# Patient Record
Sex: FEMALE | Race: WHITE | Employment: FULL TIME | URBAN - METROPOLITAN AREA
[De-identification: names, ages, dates, MRNs, and addresses within clinical notes are randomized per-mention and may not be internally consistent; named-entity substitution may affect disease eponyms.]

---

## 2017-05-05 ENCOUNTER — APPOINTMENT (OUTPATIENT)
Dept: GENERAL RADIOLOGY | Age: 42
End: 2017-05-05
Attending: EMERGENCY MEDICINE
Payer: COMMERCIAL

## 2017-05-05 ENCOUNTER — HOSPITAL ENCOUNTER (OUTPATIENT)
Age: 42
Discharge: HOME | End: 2017-05-05
Attending: EMERGENCY MEDICINE
Payer: COMMERCIAL

## 2017-05-05 VITALS
RESPIRATION RATE: 18 BRPM | BODY MASS INDEX: 29.99 KG/M2 | OXYGEN SATURATION: 100 % | WEIGHT: 180 LBS | DIASTOLIC BLOOD PRESSURE: 93 MMHG | HEIGHT: 65 IN | SYSTOLIC BLOOD PRESSURE: 147 MMHG | TEMPERATURE: 99 F | HEART RATE: 60 BPM

## 2017-05-05 DIAGNOSIS — R07.9 ACUTE CHEST PAIN: Primary | ICD-10-CM

## 2017-05-05 DIAGNOSIS — Z56.6 STRESS AT WORK: ICD-10-CM

## 2017-05-05 PROCEDURE — 99204 OFFICE O/P NEW MOD 45 MIN: CPT

## 2017-05-05 PROCEDURE — 71020 XR CHEST PA + LAT CHEST (CPT=71020): CPT | Performed by: EMERGENCY MEDICINE

## 2017-05-05 PROCEDURE — 93010 ELECTROCARDIOGRAM REPORT: CPT

## 2017-05-05 PROCEDURE — 93005 ELECTROCARDIOGRAM TRACING: CPT

## 2017-05-05 PROCEDURE — 99205 OFFICE O/P NEW HI 60 MIN: CPT

## 2017-05-05 PROCEDURE — 93010 ELECTROCARDIOGRAM REPORT: CPT | Performed by: EMERGENCY MEDICINE

## 2017-05-05 SDOH — HEALTH STABILITY - MENTAL HEALTH: OTHER PHYSICAL AND MENTAL STRAIN RELATED TO WORK: Z56.6

## 2017-05-05 NOTE — ED INITIAL ASSESSMENT (HPI)
Presents with intermittent chest tightness since yesterday. Reports palpitations yesterday described as a \"flutter\" for a few minutes. Also reports bilateral neck pain and states \"feels like I have to pop my ears. \" All symptoms occur after lunch and re

## 2017-05-06 NOTE — ED PROVIDER NOTES
Patient Seen in: [facility] Immediate Care In [address] [place] [address]    History   Patient presents with:  Chest Pain Angina (cardiovascular)    Stated Complaint: palpatations, chest tighness, h/a    HPI    40-year-old female presents for complaint of chest tigh complaint: palpatations, chest tighness, h/a  Other systems are as noted in HPI. Constitutional and vital signs reviewed. All other systems reviewed and negative except as noted above.     PSFH elements reviewed from today and agreed except as otherwi MDM    EKG is unremarkable. Chest x-ray without any acute findings. Patient's blood pressure did improve. It was still elevated. She is to follow-up regarding this. Patient's symptoms likely secondary to anxiety and stress.   She does have family h in 2 days  Primary care follow up if you don't have a PCP      Medications Prescribed:  There are no discharge medications for this patient.

## 2019-04-16 PROCEDURE — 88175 CYTOPATH C/V AUTO FLUID REDO: CPT | Performed by: OBSTETRICS & GYNECOLOGY

## 2019-04-16 PROCEDURE — 87624 HPV HI-RISK TYP POOLED RSLT: CPT | Performed by: OBSTETRICS & GYNECOLOGY

## 2019-04-16 PROCEDURE — 88304 TISSUE EXAM BY PATHOLOGIST: CPT | Performed by: OBSTETRICS & GYNECOLOGY

## (undated) NOTE — ED AVS SNAPSHOT
[facility] AND [facility] Immediate Care in [facility] [address].  [address] [facility]    Phone:  [phone]    Fax:  [phone]           Tank Spenser   MRN: [id number]    Department:  [facility] AND [facility] Immediate Care in [address] [place] [address]   Date of Visit: aspect of your visit today. In an effort to constantly improve our service to you, we would appreciate any positive or negative feedback related to the care you received in our Immediate Care. Please call our [address] at [phone].   Your Immediate contact you. Please make sure we have your correct phone number on file.      OUR CURRENT HOURS OF OPERATION:  [address] [facility]  WEEKENDS AND HOLIDAYS 8AM - 6PM    I certified that I have received a copy of the aftercare instructions; that t [employer] will allow you to access patient instructions from your recent visit,  view other health information, and more. To sign up or find more information, go to [email or web address] and click on the Sign Up Now link in the [employer] box.      Enter